# Patient Record
Sex: MALE | Race: OTHER
[De-identification: names, ages, dates, MRNs, and addresses within clinical notes are randomized per-mention and may not be internally consistent; named-entity substitution may affect disease eponyms.]

---

## 2020-09-13 ENCOUNTER — TRANSCRIPTION ENCOUNTER (OUTPATIENT)
Age: 50
End: 2020-09-13

## 2023-04-22 ENCOUNTER — EMERGENCY (EMERGENCY)
Facility: HOSPITAL | Age: 53
LOS: 0 days | Discharge: HOME | End: 2023-04-22
Attending: STUDENT IN AN ORGANIZED HEALTH CARE EDUCATION/TRAINING PROGRAM
Payer: MEDICAID

## 2023-04-22 VITALS
WEIGHT: 240.08 LBS | HEART RATE: 78 BPM | RESPIRATION RATE: 17 BRPM | TEMPERATURE: 98 F | SYSTOLIC BLOOD PRESSURE: 139 MMHG | DIASTOLIC BLOOD PRESSURE: 86 MMHG | OXYGEN SATURATION: 97 %

## 2023-04-22 DIAGNOSIS — I10 ESSENTIAL (PRIMARY) HYPERTENSION: ICD-10-CM

## 2023-04-22 DIAGNOSIS — R60.0 LOCALIZED EDEMA: ICD-10-CM

## 2023-04-22 DIAGNOSIS — M79.89 OTHER SPECIFIED SOFT TISSUE DISORDERS: ICD-10-CM

## 2023-04-22 DIAGNOSIS — M79.661 PAIN IN RIGHT LOWER LEG: ICD-10-CM

## 2023-04-22 DIAGNOSIS — M79.662 PAIN IN LEFT LOWER LEG: ICD-10-CM

## 2023-04-22 PROCEDURE — 93971 EXTREMITY STUDY: CPT | Mod: LT

## 2023-04-22 PROCEDURE — 99284 EMERGENCY DEPT VISIT MOD MDM: CPT

## 2023-04-22 PROCEDURE — 93971 EXTREMITY STUDY: CPT | Mod: 26,LT

## 2023-04-22 PROCEDURE — 99284 EMERGENCY DEPT VISIT MOD MDM: CPT | Mod: 25

## 2023-04-22 NOTE — ED PROVIDER NOTE - ATTENDING APP SHARED VISIT CONTRIBUTION OF CARE
53-year-old male, past medical history of hypertension, comes in complaining of bilateral leg swelling which he noticed 2 days ago after a day where he was standing for a long time.  Patient states symptoms improved overnight but still has some swelling in left lower extremity.  No pain.  No difficulty ambulating.  No chest pain/shortness of breath.  No skin changes.  No palpitations.  On exam, pt in NAD, AAOx3, head NC/AT, CN II-XII intact, lungs CTA B/L, CV S1S2 regular, abdomen soft/NT/ND/(+)BS, ext (+) minimal non-pitting edema over left ankle, (-) swelling/edema in right extremity, motor 5/5x4, sensation intact, ambulating with steady gait. Will do ultrasound to rule out blood clot and reevaluate.

## 2023-04-22 NOTE — ED PROVIDER NOTE - PATIENT PORTAL LINK FT
You can access the FollowMyHealth Patient Portal offered by [facility] by registering at the following website: [email or web address]. By joining [employer]’s FollowMyHealth portal, you will also be able to view your health information using other applications (apps) compatible with our system.

## 2023-04-22 NOTE — ED PROVIDER NOTE - CLINICAL SUMMARY MEDICAL DECISION MAKING FREE TEXT BOX
53-year-old male with history of hypertension presents to the ED for bilateral lower extremity swelling for the past 2 days, aggravated with standing for long periods of time, improved with elevation.  Vitals noted, triage note reviewed and duplex results personally reviewed.  Negative for DVT or superficial thrombophlebitis.  Discussed findings with patient, recommend elevation, compression stockings, close follow-up.  Discussed return precautions and he verbalized understanding.

## 2023-04-22 NOTE — ED PROVIDER NOTE - OBJECTIVE STATEMENT
53 year old male with pmhx of htn presents with bilateral leg swelling x 2 days after standing for prolonged period of time. Pt admits symptoms resolved, however still some swelling to left calf. no pain. no rash or redness. no chest pain or sob.

## 2023-04-22 NOTE — ED ADULT TRIAGE NOTE - CHIEF COMPLAINT QUOTE
Pt complaining of b/l LE swelling and pain x 2 days. left side is more swollen then right. denies medical hx

## 2023-04-22 NOTE — ED PROVIDER NOTE - PHYSICAL EXAMINATION
GEN: Patient in no acute distress  MS: Normal ROM in all extremities. No midline spinal tenderness. pulses 2 +.minimal swelling to left calf. no tenderness.   SKIN: Warm, dry, no acute rashes. Good turgor  NEURO: Strength 5/5 with no sensory deficits. Steady gait.

## 2023-04-22 NOTE — ED PROVIDER NOTE - PROGRESS NOTE DETAILS
Pt signed out to Dr. Reyes pending US and dispo. as per vascular tech, prelim duplex study is negative